# Patient Record
Sex: MALE | Race: WHITE | NOT HISPANIC OR LATINO | Employment: OTHER | URBAN - METROPOLITAN AREA
[De-identification: names, ages, dates, MRNs, and addresses within clinical notes are randomized per-mention and may not be internally consistent; named-entity substitution may affect disease eponyms.]

---

## 2023-12-21 ENCOUNTER — OFFICE VISIT (OUTPATIENT)
Dept: URGENT CARE | Facility: CLINIC | Age: 28
End: 2023-12-21
Payer: COMMERCIAL

## 2023-12-21 VITALS
RESPIRATION RATE: 18 BRPM | DIASTOLIC BLOOD PRESSURE: 90 MMHG | HEART RATE: 89 BPM | OXYGEN SATURATION: 97 % | SYSTOLIC BLOOD PRESSURE: 135 MMHG | TEMPERATURE: 98 F

## 2023-12-21 DIAGNOSIS — U07.1 COVID-19: Primary | ICD-10-CM

## 2023-12-21 DIAGNOSIS — J02.9 SORE THROAT: ICD-10-CM

## 2023-12-21 LAB
S PYO AG THROAT QL: NEGATIVE
SARS-COV-2 AG UPPER RESP QL IA: POSITIVE
VALID CONTROL: ABNORMAL

## 2023-12-21 PROCEDURE — 87880 STREP A ASSAY W/OPTIC: CPT | Performed by: NURSE PRACTITIONER

## 2023-12-21 PROCEDURE — 87070 CULTURE OTHR SPECIMN AEROBIC: CPT | Performed by: NURSE PRACTITIONER

## 2023-12-21 PROCEDURE — 99213 OFFICE O/P EST LOW 20 MIN: CPT | Performed by: NURSE PRACTITIONER

## 2023-12-21 PROCEDURE — 87811 SARS-COV-2 COVID19 W/OPTIC: CPT | Performed by: NURSE PRACTITIONER

## 2023-12-21 NOTE — PATIENT INSTRUCTIONS
COVID-19 (Coronavirus Disease 2019)   AMBULATORY CARE:   What you need to know about COVID-19:  COVID-19 is the disease caused by a coronavirus first discovered in December 2019. The virus can be spread starting 2 to 3 days before symptoms begin. The virus has changed into several new forms (called variants) since it was discovered. A variant may be more easily spread or cause more severe illness than the original form.  Signs and symptoms of COVID-19  Signs and symptoms usually start about 5 days after infection but can take 2 to 14 days. You may feel like you have the flu or a bad cold. Some signs and symptoms go away in a few days. Others can last weeks, months, or possibly years. You may have any of the following:  A cough or sore throat    Shortness of breath or trouble breathing that may become severe    A fever, possibly with chills and shaking    Muscle pain, body aches, or a headache    Sudden changes or loss of your taste or smell    Feeling mentally and physically tired (fatigue)    Congestion (stuffy head and nose), or a runny nose    Diarrhea, nausea, or vomiting    Call your local emergency number (911 in the ) if:   You have trouble breathing or shortness of breath at rest.    You have chest pain or pressure that lasts longer than 5 minutes.    You become confused or hard to wake.    Seek care immediately if:   The skin on your face, fingers, or toes look blue or darker than usual.      Call your doctor if:   You have a fever.    You have questions or concerns about your condition or care.    How COVID-19 is diagnosed:  Your healthcare provider will examine you and ask about your symptoms. Tell your provider if you have any health conditions or are taking any medicines. Your provider can help you create a COVID-19 plan if you are at high risk for severe illness. Any of the following tests may be used:  A viral test  shows if you have a current infection. Some tests are available for you to do at  home. Most use a nasal swab and give the result within 15 minutes. Your provider may want you to keep a supply of tests at home as part of your COVID-19 plan. Testing sites may also be available. A sample is taken from your nose or throat with a swab. You may need to quarantine until you get the results from a testing site.    An antigen test  shows if you have a protein from the COVID-19 virus. This test is often called a rapid test because the results are available in 30 minutes or less.    An antibody test  shows if you had a recent or past infection. Blood samples are used for this test. Antibodies are made by your immune system to fight the virus that causes COVID-19. Antibodies form 1 to 3 weeks after you are infected. This test is not used to show if you are immune to the virus.    A CT, MRI, ultrasound, or x-ray  may show complications of COVID-19, such as pneumonia or blood clots.    Treatment  may include any of the following:  Mild symptoms  may get better on their own. Some treatments have emergency use authorization (EUA). Examples include monoclonal antibodies and convalescent plasma. These may be given to help prevent worsening of your symptoms. You may also need any of the following:    Decongestants  help reduce nasal congestion and help you breathe more easily. If you take decongestant pills, they may make you feel restless or cause problems with your sleep. Do not use decongestant sprays for more than a few days.    Cough suppressants  help reduce coughing. Ask your healthcare provider which type of cough medicine is best for you.    To soothe a sore throat,  gargle with warm salt water, or use throat lozenges or a throat spray. Drink more liquids to thin and loosen mucus and to prevent dehydration.    NSAIDs or acetaminophen  can help lower a fever and relieve body aches or a headache. Follow directions. If not taken correctly, NSAIDs can cause kidney damage and acetaminophen can cause liver  damage.    Severe or life-threatening symptoms  are treated in the hospital. You may need any of the following:     Medicines  may be given to fight the virus or treat inflammation.    Blood thinners  help prevent or treat blood clots. If you have a deep vein thrombosis (DVT) or pulmonary embolism (PE), you may need to use blood thinners for at least 3 months.    Extra oxygen  may be given if you have respiratory failure. This means your lungs cannot get enough oxygen into your blood and out to your organs.    A ventilator  may be used to help you breathe.    What you need to know about long COVID:  Long COVID is a term to describe health problems the virus may cause.  Certain conditions increase your risk for long COVID.  Your risk is higher if you are 65 or older or a current or former smoker. A weak immune system, obesity, diabetes, or kidney, heart, or lung disease can also increase your risk.    Long COVID may cause severe or chronic health problems.  The most common problem is trouble thinking, remembering, or concentrating. You may also develop shortness of breath or a serious respiratory condition such as pneumonia. Blood clots may form and lead to organ damage. You may have nerve pain, trouble sleeping, or mood changes.    COVID-19 can lead to severe inflammation in your organs.  This is also called multisymptom inflammatory syndrome in adults (MIS-A) or MIS-C in children. Inflammation may affect the heart, digestive system, skin, or brain.    What you need to know about COVID-19 vaccines:  Healthcare providers recommend vaccination, even if you already had COVID-19.  Get a COVID-19 vaccine as directed.  Vaccination is recommended for everyone 6 months or older. COVID-19 vaccines are given as a shot in 1 to 3 doses as a primary series. This depends on the vaccine brand and the age of the person who receives it. A booster dose is recommended for everyone 5 years or older after the primary series is complete.  A second booster  is recommended for all adults 50 or older and for immunocompromised adolescents. The second booster is also recommended for anyone who got the 1-dose brand of vaccine for the first dose and a booster. Your provider can give you more information on boosters and help you schedule all needed doses.         Continue to protect yourself and others.  You can become infected even after you get the vaccine. You may also be able to pass the virus to others without knowing you are infected.    After you get the vaccine, check local, national, and international travel rules.  You may need to be tested before you travel. Some countries require proof of a negative test before you travel. You may also need to quarantine after you return.    Medicine may be given to prevent infection.  The medicine can be given if you are at high risk for infection and cannot get the vaccine. It can also be given if your immune system does not respond well to the vaccine.    How the 2019 coronavirus spreads:  Close personal contact with an infected person increases your risk for infection. This means being within 6 feet (2 meters) of the person for at least 15 minutes over 24 hours.  The virus travels in droplets that form when a person talks, sings, coughs, or sneezes.  The droplets can also float in the air for minutes or hours. Infection happens when you breathe in the droplets or get them in your eyes or nose.    Person-to-person contact can spread the virus.  For example, a person with the virus on his or her hands can spread it by shaking hands with someone.    The virus can stay on objects and surfaces for hours to days.  You may become infected by touching the object or surface and then touching your eyes or mouth.    Help lower your risk for COVID-19 during an active outbreak:   Stay home if you are sick or think you may have COVID-19.  It is important to stay home if you are waiting for a testing appointment or for test  results.    Wash your hands often throughout the day.  Use soap and water. Rub your soapy hands together, lacing your fingers, for at least 20 seconds. Rinse with warm, running water. Dry your hands with a clean towel or paper towel. Use hand  that contains alcohol if soap and water are not available. Teach children how to wash their hands and use hand .         Cover sneezes and coughs.  Turn your face away and cover your mouth and nose with a tissue. Throw the tissue away. Use the bend of your arm if a tissue is not available. Then wash your hands well with soap and water or use hand . Teach children how to cover a cough or sneeze.    Wear a face covering (mask) when needed.  Use a cloth covering with at least 2 layers. You can also create layers by putting a cloth covering over a disposable non-medical mask. Cover your mouth and your nose.         Try to keep space between you and others when you are out of the house.  Avoid crowds as much as possible. Wear a face covering when you must be around a large group and cannot keep space between you and others.    Clean and disinfect high-touch surfaces and objects often.  Use disinfecting wipes, or make a solution of 4 teaspoons of bleach in 1 quart (4 cups) of water.    Ask about other vaccines you may need.  Get the influenza (flu) vaccine as soon as recommended each year, usually starting in September or October. Get the pneumonia vaccine if recommended. Your healthcare provider can tell you if you should also get other vaccines, and when to get them.       Follow up with your doctor as directed:  Write down your questions so you remember to ask them during your visits.  For more information:   Centers for Disease Control and Prevention  [address] [place] [address]  Phone: 8- 468 - 109-4027  Web Address: http://www.cdc.gov    © Copyright [employer] 2023 Information is for End User's use only and may not be sold, redistributed or  otherwise used for commercial purposes.  The above information is an  only. It is not intended as medical advice for individual conditions or treatments. Talk to your doctor, nurse or pharmacist before following any medical regimen to see if it is safe and effective for you.

## 2023-12-21 NOTE — PROGRESS NOTES
[facility]        NAME: Michael Adorno is a 28 y.o. male  : 1995    MRN: [id number]  DATE: 2023  TIME: 3:40 PM      Assessment and Plan     COVID-19 [U07.1]  1. COVID-19  Poct Covid 19 Rapid Antigen Test      2. Sore throat  POCT rapid ANTIGEN strepA    Throat culture            Patient Instructions     Patient Instructions   COVID-19 (Coronavirus Disease 2019)   AMBULATORY CARE:   What you need to know about COVID-19:  COVID-19 is the disease caused by a coronavirus first discovered in 2019. The virus can be spread starting 2 to 3 days before symptoms begin. The virus has changed into several new forms (called variants) since it was discovered. A variant may be more easily spread or cause more severe illness than the original form.  Signs and symptoms of COVID-19  Signs and symptoms usually start about 5 days after infection but can take 2 to 14 days. You may feel like you have the flu or a bad cold. Some signs and symptoms go away in a few days. Others can last weeks, months, or possibly years. You may have any of the following:  A cough or sore throat    Shortness of breath or trouble breathing that may become severe    A fever, possibly with chills and shaking    Muscle pain, body aches, or a headache    Sudden changes or loss of your taste or smell    Feeling mentally and physically tired (fatigue)    Congestion (stuffy head and nose), or a runny nose    Diarrhea, nausea, or vomiting    Call your local emergency number (911 in the [place]) if:   You have trouble breathing or shortness of breath at rest.    You have chest pain or pressure that lasts longer than 5 minutes.    You become confused or hard to wake.    Seek care immediately if:   The skin on your face, fingers, or toes look blue or darker than usual.      Call your doctor if:   You have a fever.    You have questions or concerns about your condition or care.    How COVID-19 is diagnosed:  Your healthcare  provider will examine you and ask about your symptoms. Tell your provider if you have any health conditions or are taking any medicines. Your provider can help you create a COVID-19 plan if you are at high risk for severe illness. Any of the following tests may be used:  A viral test  shows if you have a current infection. Some tests are available for you to do at home. Most use a nasal swab and give the result within 15 minutes. Your provider may want you to keep a supply of tests at home as part of your COVID-19 plan. Testing sites may also be available. A sample is taken from your nose or throat with a swab. You may need to quarantine until you get the results from a testing site.    An antigen test  shows if you have a protein from the COVID-19 virus. This test is often called a rapid test because the results are available in 30 minutes or less.    An antibody test  shows if you had a recent or past infection. Blood samples are used for this test. Antibodies are made by your immune system to fight the virus that causes COVID-19. Antibodies form 1 to 3 weeks after you are infected. This test is not used to show if you are immune to the virus.    A CT, MRI, ultrasound, or x-ray  may show complications of COVID-19, such as pneumonia or blood clots.    Treatment  may include any of the following:  Mild symptoms  may get better on their own. Some treatments have emergency use authorization (EUA). Examples include monoclonal antibodies and convalescent plasma. These may be given to help prevent worsening of your symptoms. You may also need any of the following:    Decongestants  help reduce nasal congestion and help you breathe more easily. If you take decongestant pills, they may make you feel restless or cause problems with your sleep. Do not use decongestant sprays for more than a few days.    Cough suppressants  help reduce coughing. Ask your healthcare provider which type of cough medicine is best for you.    To  soothe a sore throat,  gargle with warm salt water, or use throat lozenges or a throat spray. Drink more liquids to thin and loosen mucus and to prevent dehydration.    NSAIDs or acetaminophen  can help lower a fever and relieve body aches or a headache. Follow directions. If not taken correctly, NSAIDs can cause kidney damage and acetaminophen can cause liver damage.    Severe or life-threatening symptoms  are treated in the hospital. You may need any of the following:     Medicines  may be given to fight the virus or treat inflammation.    Blood thinners  help prevent or treat blood clots. If you have a deep vein thrombosis (DVT) or pulmonary embolism (PE), you may need to use blood thinners for at least 3 months.    Extra oxygen  may be given if you have respiratory failure. This means your lungs cannot get enough oxygen into your blood and out to your organs.    A ventilator  may be used to help you breathe.    What you need to know about long COVID:  Long COVID is a term to describe health problems the virus may cause.  Certain conditions increase your risk for long COVID.  Your risk is higher if you are 65 or older or a current or former smoker. A weak immune system, obesity, diabetes, or kidney, heart, or lung disease can also increase your risk.    Long COVID may cause severe or chronic health problems.  The most common problem is trouble thinking, remembering, or concentrating. You may also develop shortness of breath or a serious respiratory condition such as pneumonia. Blood clots may form and lead to organ damage. You may have nerve pain, trouble sleeping, or mood changes.    COVID-19 can lead to severe inflammation in your organs.  This is also called multisymptom inflammatory syndrome in adults (MIS-A) or MIS-C in children. Inflammation may affect the heart, digestive system, skin, or brain.    What you need to know about COVID-19 vaccines:  Healthcare providers recommend vaccination, even if you  already had COVID-19.  Get a COVID-19 vaccine as directed.  Vaccination is recommended for everyone 6 months or older. COVID-19 vaccines are given as a shot in 1 to 3 doses as a primary series. This depends on the vaccine brand and the age of the person who receives it. A booster dose is recommended for everyone 5 years or older after the primary series is complete. A second booster  is recommended for all adults 50 or older and for immunocompromised adolescents. The second booster is also recommended for anyone who got the 1-dose brand of vaccine for the first dose and a booster. Your provider can give you more information on boosters and help you schedule all needed doses.         Continue to protect yourself and others.  You can become infected even after you get the vaccine. You may also be able to pass the virus to others without knowing you are infected.    After you get the vaccine, check local, national, and international travel rules.  You may need to be tested before you travel. Some countries require proof of a negative test before you travel. You may also need to quarantine after you return.    Medicine may be given to prevent infection.  The medicine can be given if you are at high risk for infection and cannot get the vaccine. It can also be given if your immune system does not respond well to the vaccine.    How the 2019 coronavirus spreads:  Close personal contact with an infected person increases your risk for infection. This means being within 6 feet (2 meters) of the person for at least 15 minutes over 24 hours.  The virus travels in droplets that form when a person talks, sings, coughs, or sneezes.  The droplets can also float in the air for minutes or hours. Infection happens when you breathe in the droplets or get them in your eyes or nose.    Person-to-person contact can spread the virus.  For example, a person with the virus on his or her hands can spread it by shaking hands with  someone.    The virus can stay on objects and surfaces for hours to days.  You may become infected by touching the object or surface and then touching your eyes or mouth.    Help lower your risk for COVID-19 during an active outbreak:   Stay home if you are sick or think you may have COVID-19.  It is important to stay home if you are waiting for a testing appointment or for test results.    Wash your hands often throughout the day.  Use soap and water. Rub your soapy hands together, lacing your fingers, for at least 20 seconds. Rinse with warm, running water. Dry your hands with a clean towel or paper towel. Use hand  that contains alcohol if soap and water are not available. Teach children how to wash their hands and use hand .         Cover sneezes and coughs.  Turn your face away and cover your mouth and nose with a tissue. Throw the tissue away. Use the bend of your arm if a tissue is not available. Then wash your hands well with soap and water or use hand . Teach children how to cover a cough or sneeze.    Wear a face covering (mask) when needed.  Use a cloth covering with at least 2 layers. You can also create layers by putting a cloth covering over a disposable non-medical mask. Cover your mouth and your nose.         Try to keep space between you and others when you are out of the house.  Avoid crowds as much as possible. Wear a face covering when you must be around a large group and cannot keep space between you and others.    Clean and disinfect high-touch surfaces and objects often.  Use disinfecting wipes, or make a solution of 4 teaspoons of bleach in 1 quart (4 cups) of water.    Ask about other vaccines you may need.  Get the influenza (flu) vaccine as soon as recommended each year, usually starting in September or October. Get the pneumonia vaccine if recommended. Your healthcare provider can tell you if you should also get other vaccines, and when to get them.       Follow  "up with your doctor as directed:  Write down your questions so you remember to ask them during your visits.  For more information:   [facility]ers for Disease Control and Prevention  [address] [place] [address]  Phone: 9- 230 - [phone]  Web Address: http://www.cdc.gov    © Copyright [employer]ative 2023 Information is for End User's use only and may not be sold, redistributed or otherwise used for commercial purposes.  The above information is an  only. It is not intended as medical advice for individual conditions or treatments. Talk to your doctor, nurse or pharmacist before following any medical regimen to see if it is safe and effective for you.      Follow up with PCP in 3-5 days.  Proceed to  ER if symptoms worsen.    Chief Complaint     Chief Complaint   Patient presents with    Generalized Body Aches     C/o generalized body aches, sore throat, and nasal congestion onset \"4 days ago\". Denies home COVID testing. Reports utilizing OTC medications, \"not improving\". Denies PCP contact.     Sore Throat    Nasal Congestion         History of Present Illness     Patient reports onset of s/s illness Mon morning.  No specific sick contact.  No chronic health problems, no regular medications.    Generalized Body Aches  Associated symptoms include congestion and a sore throat.   Sore Throat   Associated symptoms include congestion.       Review of Systems     Review of Systems   HENT:  Positive for congestion and sore throat.    Musculoskeletal:  Positive for myalgias.   All other systems reviewed and are negative.        Current Medications     No current outpatient medications on file.    Current Allergies     Allergies as of 12/21/2023    (No Known Allergies)              The following portions of the patient's history were reviewed and updated as appropriate: allergies, current medications, past family history, past medical history, past social history, past surgical history and problem list. "     History reviewed. No pertinent past medical history.    Past Surgical History:   Procedure Laterality Date    SHOULDER SURGERY Left        History reviewed. No pertinent family history.      Medications have been verified.        Objective     /90 (BP Location: Right arm, Patient Position: Sitting)   Pulse 89   Temp 98 °F (36.7 °C) (Temporal)   Resp 18   SpO2 97%   No LMP for male patient.         Physical Exam     Physical Exam  Vitals and nursing note reviewed.   Constitutional:       General: He is not in acute distress.     Appearance: Normal appearance. He is well-developed. He is not ill-appearing, toxic-appearing or diaphoretic.   HENT:      Head: Normocephalic and atraumatic.      Right Ear: Tympanic membrane, ear canal and external ear normal.      Left Ear: Tympanic membrane, ear canal and external ear normal.      Nose: Mucosal edema and congestion present.      Mouth/Throat:      Mouth: Mucous membranes are moist.      Pharynx: Oropharynx is clear. Uvula midline. Posterior oropharyngeal erythema (mild) present. No oropharyngeal exudate.      Tonsils: No tonsillar exudate or tonsillar abscesses. 1+ on the right. 1+ on the left.   Eyes:      Pupils: Pupils are equal, round, and reactive to light.   Cardiovascular:      Rate and Rhythm: Normal rate and regular rhythm.      Heart sounds: Normal heart sounds. No murmur heard.     No friction rub. No gallop.   Pulmonary:      Effort: Pulmonary effort is normal. No respiratory distress.   Abdominal:      General: There is no distension.      Palpations: Abdomen is soft.   Musculoskeletal:         General: Normal range of motion.      Cervical back: Normal range of motion and neck supple.   Lymphadenopathy:      Cervical: Cervical adenopathy (slight) present.   Skin:     General: Skin is warm and dry.      Capillary Refill: Capillary refill takes less than 2 seconds.   Neurological:      General: No focal deficit present.      Mental Status: He is  alert and oriented to person, place, and time.   Psychiatric:         Mood and Affect: Mood normal.         Behavior: Behavior normal.         Thought Content: Thought content normal.         Judgment: Judgment normal.        Principal Discharge DX:	Nausea, vomiting and diarrhea

## 2023-12-23 LAB — BACTERIA THROAT CULT: NORMAL
